# Patient Record
Sex: FEMALE | Race: WHITE | NOT HISPANIC OR LATINO | Employment: OTHER | URBAN - METROPOLITAN AREA
[De-identification: names, ages, dates, MRNs, and addresses within clinical notes are randomized per-mention and may not be internally consistent; named-entity substitution may affect disease eponyms.]

---

## 2020-10-22 ENCOUNTER — APPOINTMENT (OUTPATIENT)
Dept: RADIOLOGY | Facility: MEDICAL CENTER | Age: 75
End: 2020-10-22
Attending: ORTHOPAEDIC SURGERY
Payer: MEDICARE

## 2020-12-22 ENCOUNTER — PATIENT OUTREACH (OUTPATIENT)
Dept: HEALTH INFORMATION MANAGEMENT | Facility: OTHER | Age: 75
End: 2020-12-22

## 2020-12-22 NOTE — PROGRESS NOTES
Called patient to schedule for AWV.    Outcome:   Left Message Please transfer to Patient Outreach Team at [phone] when patient returns call.     Attempt # [phone]  -[id number]

## 2021-12-14 ENCOUNTER — TELEPHONE (OUTPATIENT)
Dept: HEALTH INFORMATION MANAGEMENT | Facility: OTHER | Age: 76
End: 2021-12-14

## 2021-12-28 PROBLEM — E78.5 HYPERLIPIDEMIA: Status: ACTIVE | Noted: 2021-12-28

## 2021-12-28 PROBLEM — M17.11 ARTHRITIS OF RIGHT KNEE: Status: ACTIVE | Noted: 2020-11-09

## 2022-04-18 ENCOUNTER — APPOINTMENT (RX ONLY)
Dept: URBAN - METROPOLITAN AREA CLINIC 35 | Facility: CLINIC | Age: 77
Setting detail: DERMATOLOGY
End: 2022-04-18

## 2022-04-18 DIAGNOSIS — Z41.9 ENCOUNTER FOR PROCEDURE FOR PURPOSES OTHER THAN REMEDYING HEALTH STATE, UNSPECIFIED: ICD-10-CM

## 2022-04-18 PROCEDURE — ? ADDITIONAL NOTES

## 2022-04-18 PROCEDURE — ? MEDICAL CONSULTATION: FILLERS

## 2022-04-18 PROCEDURE — ? MEDICAL CONSULTATION: LASER RESURFACING

## 2022-04-18 ASSESSMENT — LOCATION ZONE DERM: LOCATION ZONE: FACE

## 2022-04-18 ASSESSMENT — LOCATION DETAILED DESCRIPTION DERM: LOCATION DETAILED: LEFT INFERIOR MEDIAL MALAR CHEEK

## 2022-04-18 ASSESSMENT — LOCATION SIMPLE DESCRIPTION DERM: LOCATION SIMPLE: LEFT CHEEK

## 2022-04-18 NOTE — PROCEDURE: ADDITIONAL NOTES
Additional Notes: Pt interested in laser resurfacing for treatment of lines and wrinkles, she is concerned with amount of downtime associated the Fraxel (she is still currently working) and feels as thought the clear and brilliant would not be a good option. She does want to do some Vobella on her upper lip, she will book her first treatment in the next month.
Detail Level: Simple
Render Risk Assessment In Note?: no

## 2022-05-05 ENCOUNTER — APPOINTMENT (RX ONLY)
Dept: URBAN - METROPOLITAN AREA CLINIC 36 | Facility: CLINIC | Age: 77
Setting detail: DERMATOLOGY
End: 2022-05-05

## 2022-05-05 DIAGNOSIS — Z41.9 ENCOUNTER FOR PROCEDURE FOR PURPOSES OTHER THAN REMEDYING HEALTH STATE, UNSPECIFIED: ICD-10-CM

## 2022-05-05 PROCEDURE — ? FILLERS

## 2022-05-05 NOTE — PROCEDURE: FILLERS
Decollete Filler Volume In Cc: 0
VAZATA Ultra Plus Xc Syringe Price (Per 1.0 Cc- Numbers Only No Special Characters Or $): 0.00
Expiration Date (Month Year): 06/26/2023
Nasolabial Folds Filler Volume In Cc: 0.7
Lot #: [id number]
Vermilion Lips Filler Volume In Cc: 0.3
Consent: Written consent obtained. Risks include but not limited to bruising, beading, irregular texture, ulceration, infection, allergic reaction, scar formation, incomplete augmentation, temporary nature, procedural pain.
Lot #: [id number]
Marionette Lines Filler Volume In Cc: 1
Pre-Treatment: Skin was cleaned with alcohol prior to injections.
Post-Care Instructions: Patient instructed to apply ice to reduce swelling.
Expiration Date (Month Year): 09/30/2020
Additional Area 1 Location: anna oral rhytids
Expiration Date (Month Year): 12/19/2022
Pricing Information: This plan will add up the cumulative amount of filler you document and will bill based on the unit price noted below. For example if you inject 0.9 ccs of Restylane it will bill for one unit. If you inject 1.3 ccs it will bill for two units.
Topical Anesthesia?: 23% lidocaine, 7% tetracaine
Additional Area 1 Location: anna oral rhythm
Include Cannula Information In Note?: Yes
Detail Level: Detailed
Lot #: [id number]
Use Map Statement For Sites (Optional): No
Include Cannula Brand?: DermaSculpt
Additional Area 2 Location: chin
Filler: Juvederm Ultra XC
Expiration Date (Month Year): 10/18/2022
Include Cannula Size?: 27G
Lot #: [id number]
Map Statment: See Attach Map for Complete Details
Include Cannula Length?: 1.5 inch

## 2022-08-23 PROBLEM — M17.32 POST-TRAUMATIC OSTEOARTHRITIS OF LEFT KNEE: Status: ACTIVE | Noted: 2022-03-08

## 2022-08-23 PROBLEM — Z96.7: Status: ACTIVE | Noted: 2022-03-08

## 2022-12-22 ENCOUNTER — APPOINTMENT (RX ONLY)
Dept: URBAN - METROPOLITAN AREA CLINIC 36 | Facility: CLINIC | Age: 77
Setting detail: DERMATOLOGY
End: 2022-12-22

## 2022-12-22 DIAGNOSIS — Z41.9 ENCOUNTER FOR PROCEDURE FOR PURPOSES OTHER THAN REMEDYING HEALTH STATE, UNSPECIFIED: ICD-10-CM

## 2022-12-22 PROCEDURE — ? FILLERS

## 2022-12-22 NOTE — PROCEDURE: FILLERS
Decollete Filler Volume In Cc: 0
Apsmart Ultra Plus Xc Syringe Price (Per 1.0 Cc- Numbers Only No Special Characters Or $): 0.00
Expiration Date (Month Year): 06/26/2023
Nasolabial Folds Filler Volume In Cc: 1
Lot #: [id number]
Vermilion Lips Filler Volume In Cc: 0.3
Consent: Written consent obtained. Risks include but not limited to bruising, beading, irregular texture, ulceration, infection, allergic reaction, scar formation, incomplete augmentation, temporary nature, procedural pain.
Lot #: [id number]
Marionette Lines Filler Volume In Cc: 0.7
Pre-Treatment: Skin was cleaned with alcohol prior to injections.
Post-Care Instructions: Patient instructed to apply ice to reduce swelling.
Expiration Date (Month Year): 09/30/2020
Additional Area 1 Location: anna oral rhytids
Expiration Date (Month Year): 07/01/2023
Pricing Information: This plan will add up the cumulative amount of filler you document and will bill based on the unit price noted below. For example if you inject 0.9 ccs of Restylane it will bill for one unit. If you inject 1.3 ccs it will bill for two units.
Topical Anesthesia?: 23% lidocaine, 7% tetracaine
Additional Area 1 Location: anna oral rhythm
Include Cannula Information In Note?: Yes
Detail Level: Detailed
Lot #: [id number]
Use Map Statement For Sites (Optional): No
Include Cannula Brand?: DermaSculpt
Additional Area 2 Location: chin
Filler: Juvederm Ultra XC
Expiration Date (Month Year): 10/18/2022
Include Cannula Size?: 27G
Lot #: [id number]
Map Statment: See Attach Map for Complete Details
Include Cannula Length?: 1.5 inch

## 2023-09-05 PROBLEM — E66.3 OVERWEIGHT (BMI 25.0-29.9): Status: ACTIVE | Noted: 2023-09-05

## 2024-09-10 ENCOUNTER — APPOINTMENT (RX ONLY)
Dept: URBAN - METROPOLITAN AREA CLINIC 20 | Facility: CLINIC | Age: 79
Setting detail: DERMATOLOGY
End: 2024-09-10

## 2024-09-10 DIAGNOSIS — Z41.9 ENCOUNTER FOR PROCEDURE FOR PURPOSES OTHER THAN REMEDYING HEALTH STATE, UNSPECIFIED: ICD-10-CM

## 2024-09-10 PROCEDURE — ? FILLERS

## 2024-09-10 NOTE — PROCEDURE: FILLERS
Additional Area 2 Volume In Cc: 0
Include Cannula Information In Note?: No
Detail Level: Detailed
Additional Area 1 Location: perioral rhytides
Vermilion Lips Filler Volume In Cc: 0.5
Anesthesia Type: 1% lidocaine with epinephrine
Additional Area 2 Location: laugh lines
Lot #: [id number]
Filler: Juvederm Vollure XC
Consent: Written consent obtained. Risks include but not limited to bruising, beading, irregular texture, ulceration, infection, allergic reaction, scar formation, incomplete augmentation, temporary nature, and procedural pain.
Additional Area 3 Location: cheeks
Post-Care Instructions: After the procedure, patient instructed to apply ice to reduce swelling.
Lot #: [id number]
Expiration Date (Month Year): 2025-03-06
Additional Anesthesia Volume In Cc: 6
Nasolabial Folds Filler Volume In Cc: 0.4
Expiration Date (Month Year): 2024-08-27
Map Statment: See Attach Map for Complete Details
Marionette Lines Filler Volume In Cc: 0.6
Additional Area 1 Location: perioral wrinkles
Lot #: [id number]
Aspiration Statement: Aspiration was performed prior to injecting site with filler.
Expiration Date (Month Year): 2025-02-28
Filler: Juvederm Volbella XC

## 2025-01-28 ENCOUNTER — APPOINTMENT (OUTPATIENT)
Dept: URBAN - METROPOLITAN AREA CLINIC 20 | Facility: CLINIC | Age: 80
Setting detail: DERMATOLOGY
End: 2025-01-28

## 2025-01-28 DIAGNOSIS — Z41.9 ENCOUNTER FOR PROCEDURE FOR PURPOSES OTHER THAN REMEDYING HEALTH STATE, UNSPECIFIED: ICD-10-CM

## 2025-01-28 PROCEDURE — ? COSMETIC CONSULTATION: LASER RESURFACING

## 2025-03-04 ENCOUNTER — APPOINTMENT (OUTPATIENT)
Dept: URBAN - METROPOLITAN AREA CLINIC 20 | Facility: CLINIC | Age: 80
Setting detail: DERMATOLOGY
End: 2025-03-04

## 2025-03-04 DIAGNOSIS — Z41.9 ENCOUNTER FOR PROCEDURE FOR PURPOSES OTHER THAN REMEDYING HEALTH STATE, UNSPECIFIED: ICD-10-CM

## 2025-03-04 PROCEDURE — ? FRAXEL

## 2025-03-04 PROCEDURE — ? ADDITIONAL NOTES

## 2025-03-04 NOTE — PROCEDURE: FRAXEL
Length Of Topical Anesthesia Application (Optional): 60 minutes
Number Of Passes: 4
Small Metal Eye Shield Text: The ocular mucosa was anesthetized with tetracaine. Once adequate anesthesia was optained, small metal eye shields were inserted and remained in place until the procedure was completed.
Treatment Level: 3
Treatment Level: 1
Location: decolletage of the chest
Wavelength: 1550nm
Detail Level: Zone
Energy(Mj/Cm2): 20
Medium Metal Eye Shield Text: The ocular mucosa was anesthetized with tetracaine. Once adequate anesthesia was optained, medium metal eye shields were inserted and remained in place until the procedure was completed.
Was An Eye Shield Used?: No
Consent: Written consent obtained, risks reviewed including but not limited to pain and incomplete improvement.
Location: full face except eyelids
Topical Anesthesia Type: 23% lidocaine, 7% tetracaine
Large Metal Eye Shield Text: The ocular mucosa was anesthetized with tetracaine. Once adequate anesthesia was optained, large metal eye shields were inserted and remained in place until the procedure was completed.
External Cooling Fan Speed: 5
Total Coverage: 45%
Location: dorsal hands
Small Plastic Eye Shield Text: The ocular mucosa was anesthetized with tetracaine. Once adequate anesthesia was optained, small plastic eye shields were inserted and remained in place until the procedure was completed.
Post-Care Instructions: I reviewed with the patient in detail post-care instructions. Patient should avoid sun until area fully healed.
Large Plastic Eye Shield Text: The ocular mucosa was anesthetized with tetracaine. Once adequate anesthesia was optained, large plastic eye shields were inserted and remained in place until the procedure was completed.
Indication: resurfacing
Location: Use Location Override
Wavelength: 1927nm
Medium Plastic Eye Shield Text: The ocular mucosa was anesthetized with tetracaine. Once adequate anesthesia was optained, medium plastic eye shields were inserted and remained in place until the procedure was completed.
Energy(Mj/Cm2): 15

## 2025-03-04 NOTE — PROCEDURE: ADDITIONAL NOTES
Detail Level: Simple
Additional Notes: We couldn’t do too high of a setting as her skin was very sensitive to it
Render Risk Assessment In Note?: no